# Patient Record
Sex: FEMALE | Race: AMERICAN INDIAN OR ALASKA NATIVE
[De-identification: names, ages, dates, MRNs, and addresses within clinical notes are randomized per-mention and may not be internally consistent; named-entity substitution may affect disease eponyms.]

---

## 2019-11-08 ENCOUNTER — HOSPITAL ENCOUNTER (OUTPATIENT)
Dept: HOSPITAL 5 - XRAY | Age: 69
Discharge: HOME | End: 2019-11-08
Attending: NURSE PRACTITIONER
Payer: MEDICARE

## 2019-11-08 DIAGNOSIS — M17.11: Primary | ICD-10-CM

## 2019-11-08 DIAGNOSIS — M19.011: ICD-10-CM

## 2019-11-08 DIAGNOSIS — M51.36: ICD-10-CM

## 2019-11-08 PROCEDURE — 72100 X-RAY EXAM L-S SPINE 2/3 VWS: CPT

## 2019-11-08 NOTE — XRAY REPORT
LUMBOSACRAL SPINE, 3 VIEWS



INDICATION:  BACK PAIN.



COMPARISON: None.



IMPRESSION:  Borderline bone mineralization. There is moderate levocurvature to the lower thoracic sp
ine and upper lumbar spine. Moderate to severe multilevel degenerative disc disease and hypertrophic 
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 No evidence for compression deformity or malalignment.  No acute os
seous or soft tissue abnormality.    



Signer Name: Roosevelt Cisneros Jr, MD 

Signed: 11/8/2019 11:50 AM

 Workstation Name: [id number]

## 2019-11-08 NOTE — XRAY REPORT
RIGHT SHOULDER, 3 VIEWS



INDICATION:  M25.511 RIGHT SHOULDER PAIN. 



COMPARISON: None.



IMPRESSION:  Borderline bone mineralization. Mild to moderate osteoarthritic changes are identified a
t the glenohumeral joint and acromioclavicular joint.  No evidence for fracture, ligamentous injury o
r bone lesion. The soft tissues are unremarkable.



Signer Name: Roosevelt Cisneros Jr, MD 

Signed: 11/8/2019 11:49 AM

 Workstation Name: [id number]

## 2019-11-08 NOTE — XRAY REPORT
RIGHT KNEE, 3 VIEWS



INDICATION:  M25.561 PAIN IN RIGHT KNEE. 



COMPARISON: None.



IMPRESSION:  Borderline bone mineralization.  Mild osteoarthritic changes are identified in all 3 com
partments of the knee. No advanced degenerative changes. Trace joint effusion is suspected on the lat
eral image. No evidence for fracture or bone lesion.



Signer Name: Roosevelt Cisneros Jr, MD 

Signed: 11/8/2019 11:49 AM

 Workstation Name: [id number]